# Patient Record
Sex: MALE | Race: OTHER | HISPANIC OR LATINO
[De-identification: names, ages, dates, MRNs, and addresses within clinical notes are randomized per-mention and may not be internally consistent; named-entity substitution may affect disease eponyms.]

---

## 2021-05-10 ENCOUNTER — APPOINTMENT (OUTPATIENT)
Dept: DISASTER EMERGENCY | Facility: OTHER | Age: 31
End: 2021-05-10
Payer: COMMERCIAL

## 2021-05-10 PROCEDURE — 0012A: CPT

## 2021-05-17 PROBLEM — Z00.00 ENCOUNTER FOR PREVENTIVE HEALTH EXAMINATION: Status: ACTIVE | Noted: 2021-05-17

## 2021-05-20 ENCOUNTER — APPOINTMENT (OUTPATIENT)
Dept: ALLERGY | Facility: CLINIC | Age: 31
End: 2021-05-20

## 2022-10-26 ENCOUNTER — EMERGENCY (EMERGENCY)
Facility: HOSPITAL | Age: 32
LOS: 1 days | Discharge: HOME | End: 2022-10-26
Admitting: STUDENT IN AN ORGANIZED HEALTH CARE EDUCATION/TRAINING PROGRAM

## 2022-10-26 VITALS
SYSTOLIC BLOOD PRESSURE: 140 MMHG | HEART RATE: 86 BPM | OXYGEN SATURATION: 98 % | DIASTOLIC BLOOD PRESSURE: 87 MMHG | TEMPERATURE: 97 F | RESPIRATION RATE: 20 BRPM

## 2022-10-26 PROCEDURE — 71046 X-RAY EXAM CHEST 2 VIEWS: CPT | Mod: 26

## 2022-10-26 PROCEDURE — 99285 EMERGENCY DEPT VISIT HI MDM: CPT

## 2022-10-26 PROCEDURE — 93010 ELECTROCARDIOGRAM REPORT: CPT

## 2022-10-26 RX ORDER — IPRATROPIUM/ALBUTEROL SULFATE 18-103MCG
3 AEROSOL WITH ADAPTER (GRAM) INHALATION ONCE
Refills: 0 | Status: COMPLETED | OUTPATIENT
Start: 2022-10-26 | End: 2022-10-26

## 2022-10-26 RX ORDER — FLUTICASONE PROPIONATE 50 MCG
1 SPRAY, SUSPENSION NASAL
Qty: 30 | Refills: 0
Start: 2022-10-26 | End: 2022-11-04

## 2022-10-26 RX ORDER — ALBUTEROL 90 UG/1
2 AEROSOL, METERED ORAL
Qty: 20 | Refills: 0
Start: 2022-10-26 | End: 2022-11-24

## 2022-10-26 RX ADMIN — Medication 3 MILLILITER(S): at 21:07

## 2022-10-26 RX ADMIN — Medication 50 MILLIGRAM(S): at 21:06

## 2022-10-26 NOTE — ED PROVIDER NOTE - PATIENT PORTAL LINK FT
You can access the FollowMyHealth Patient Portal offered by [facility] by registering at the following website: [email or web address]. By joining [employer]’s FollowMyHealth portal, you will also be able to view your health information using other applications (apps) compatible with our system.

## 2022-10-26 NOTE — ED PROVIDER NOTE - CLINICAL SUMMARY MEDICAL DECISION MAKING FREE TEXT BOX
31 Y/O M w/ no PMH presents to ER for shortness of breath.   Breathing treatment/steroids  XR r/o CAP  Re-evaluate

## 2022-10-26 NOTE — ED ADULT NURSE NOTE - OBJECTIVE STATEMENT
Received pt in room 12. A&Ox4, ambulatory at baseline, c/o ower abd pain with urinary frequency, States seen at urgent care prescribed antibiotics today, but pain is worsening. No significant PMH. VSS. RR even and unlabored. Satting 99% room air. Medication given. Awaiting further orders from provider.

## 2022-10-26 NOTE — ED PROVIDER NOTE - OBJECTIVE STATEMENT
33 Y/O M w/ no PMH presents to ER for shortness of breath. Developed dry cough and stuffy nose 3 days ago. Has since experienced sore throat worsening cough w/ associated chest pain. Taking Dayquil w/ minimal relief. No recent travel. No sick contacts. Vaccinated. Non smoker. Denies fever, chills, nausea/vomiting, dizziness, headache, abdominal pain. No hx of asthma

## 2022-10-26 NOTE — ED PROVIDER NOTE - NS ED ROS FT
Constitutional: (-) fever   Head: Normal cephalic, Atraumatic  Eyes/ENT: (-) vision changes  Cardiovascular: (+) chest pain, (-) wheezing  Respiratory: (+) cough, (+) shortness of breath  Gastrointestinal: (-) vomiting, (-) diarrhea, (-) abdominal pain  : (-) dysuria   Musculoskeletal: (-) back pain  Integumentary: (-) rash, (-) edema  Neurological: (-)loc  Allergic/Immunologic: (-) pruritus

## 2023-12-28 ENCOUNTER — EMERGENCY (EMERGENCY)
Facility: HOSPITAL | Age: 33
LOS: 1 days | Discharge: HOME | End: 2023-12-28
Admitting: STUDENT IN AN ORGANIZED HEALTH CARE EDUCATION/TRAINING PROGRAM
Payer: MEDICAID

## 2023-12-28 VITALS
DIASTOLIC BLOOD PRESSURE: 75 MMHG | SYSTOLIC BLOOD PRESSURE: 136 MMHG | TEMPERATURE: 99 F | OXYGEN SATURATION: 99 % | RESPIRATION RATE: 16 BRPM | HEART RATE: 60 BPM

## 2023-12-28 VITALS
SYSTOLIC BLOOD PRESSURE: 162 MMHG | HEART RATE: 86 BPM | DIASTOLIC BLOOD PRESSURE: 100 MMHG | TEMPERATURE: 97 F | RESPIRATION RATE: 18 BRPM | OXYGEN SATURATION: 96 %

## 2023-12-28 PROCEDURE — 93971 EXTREMITY STUDY: CPT | Mod: 26,LT

## 2023-12-28 PROCEDURE — 73610 X-RAY EXAM OF ANKLE: CPT | Mod: 26,LT

## 2023-12-28 PROCEDURE — 99284 EMERGENCY DEPT VISIT MOD MDM: CPT

## 2023-12-28 RX ORDER — IBUPROFEN 200 MG
800 TABLET ORAL ONCE
Refills: 0 | Status: COMPLETED | OUTPATIENT
Start: 2023-12-28 | End: 2023-12-28

## 2023-12-28 RX ADMIN — Medication 800 MILLIGRAM(S): at 10:52

## 2023-12-28 RX ADMIN — Medication 800 MILLIGRAM(S): at 10:16

## 2023-12-28 NOTE — ED PROVIDER NOTE - PATIENT PORTAL LINK FT
You can access the FollowMyHealth Patient Portal offered by [facility] by registering at the following website: [email or web address]. By joining [employer]’s FollowMyHealth portal, you will also be able to view your health information using other applications (apps) compatible with our system. You can access the FollowMyHealth Patient Portal offered by [facility] by registering at the following website: [email or web address]. By joining [employer]’s FollowMyHealth portal, you will also be able to view your health information using other applications (apps) compatible with our system.

## 2023-12-28 NOTE — ED PROVIDER NOTE - NSFOLLOWUPINSTRUCTIONS_ED_ALL_ED_FT
Follow up with your PMD within 1-2 days or you can call our clinic at [phone] for an appointment  Take all of your other medications as previously prescribed.  Worsening, continued or ANY new concerning symptoms return to the Emergency Department. Follow up with your PMD within 1-2 days or you can call our clinic at [phone] for an appointment  Take all of your other medications as previously prescribed.  Worsening, continued or ANY new concerning symptoms return to the Emergency Department.

## 2023-12-28 NOTE — ED PROVIDER NOTE - PROGRESS NOTE DETAILS
Duplex ruled out DVT; x-rays unremarkable.  Stable for DC home with PMD follow-up strict return precautions.

## 2023-12-28 NOTE — ED PROVIDER NOTE - OBJECTIVE STATEMENT
33-year-old male with no known past medical history presents to the ED complaining of pain and swelling to the left leg since yesterday.  Patient states he went to the gym, was on the treadmill running and walking on an incline, and afterwards he began to have pain to the ankle radiating to the calf, worse on ambulation.  Denies any weakness numbness or tingling sensation, also denies any chest pain or shortness of breath, denies any other complaints.

## 2023-12-28 NOTE — ED ADULT NURSE NOTE - OBJECTIVE STATEMENT
Patient is a 32 yo male, denies phx, presenting with L ankle swelling and discomfort after running yesterday. Denies rolling ankle or fall. Able to walk. No calf swelling. Medicated for pain per orders. Pending x-ray/US. Fall precautions maintained.
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
## 2023-12-28 NOTE — ED PROVIDER NOTE - CLINICAL SUMMARY MEDICAL DECISION MAKING FREE TEXT BOX
33-year-old male with no known past medical history presents to the ED complaining of pain and swelling to the left leg since yesterday.  HD stable, patient is fully ambulatory in the ED with steady gait, no acute distress.  On exam, left leg noticeable swelling, no gross bony deformity or tenderness, distal pulses intact, motor sensor intact.  Impression left leg pain rule out DVT.  Plan for  duplex US, x-rays, analgesics, reassess.

## 2023-12-28 NOTE — ED ADULT NURSE NOTE - NSFALLUNIVINTERV_ED_ALL_ED
Bed/Stretcher in lowest position, wheels locked, appropriate side rails in place/Call bell, personal items and telephone in reach/Instruct patient to call for assistance before getting out of bed/chair/stretcher/Non-slip footwear applied when patient is off stretcher/[place] to call system/Physically safe environment - no spills, clutter or unnecessary equipment/Purposeful proactive rounding/Room/bathroom lighting operational, light cord in reach Bed/Stretcher in lowest position, wheels locked, appropriate side rails in place/Call bell, personal items and telephone in reach/Instruct patient to call for assistance before getting out of bed/chair/stretcher/Non-slip footwear applied when patient is off stretcher/[place] to call system/Physically safe environment - no spills, clutter or unnecessary equipment/Purposeful proactive rounding/Room/bathroom lighting operational, light cord in reach

## 2024-02-17 ENCOUNTER — EMERGENCY (EMERGENCY)
Facility: HOSPITAL | Age: 34
LOS: 1 days | Discharge: HOME | End: 2024-02-17
Attending: EMERGENCY MEDICINE | Admitting: EMERGENCY MEDICINE
Payer: MEDICAID

## 2024-02-17 VITALS — RESPIRATION RATE: 15 BRPM | OXYGEN SATURATION: 100 % | HEART RATE: 61 BPM | TEMPERATURE: 98 F

## 2024-02-17 PROCEDURE — 99283 EMERGENCY DEPT VISIT LOW MDM: CPT

## 2024-02-17 NOTE — ED PROVIDER NOTE - CLINICAL SUMMARY MEDICAL DECISION MAKING FREE TEXT BOX
33-year-old male with no past medical history presents with right hand pain since last Friday 2/9/24.  patient states he was picking up a shard from the floor when he felt, "a pop ".   Since then has been experiencing intermittent sharp pain along right palmar crease associated with certain movements, not relieved by putting right hand in a brace.  No fever/chills, swelling, redness.  Vitals within normal limits.  Exam well-appearing, right hand with full range of motion, no edema, erythema, crepitus.  Sensation and motor intact.  Presentation low concern for fracture or infection.  No urgent intervention indicated.  Will provide patient follow-up with hand specialist.  Strict return precautions.

## 2024-02-17 NOTE — ED PROVIDER NOTE - PATIENT PORTAL LINK FT
You can access the FollowMyHealth Patient Portal offered by [facility] by registering at the following website: [email or web address]. By joining [employer]’s FollowMyHealth portal, you will also be able to view your health information using other applications (apps) compatible with our system.

## 2024-02-17 NOTE — ED ADULT TRIAGE NOTE - MEANS OF ARRIVAL
ambulatory Quinolones Counseling:  I discussed with the patient the risks of fluoroquinolones including but not limited to GI upset, allergic reaction, drug rash, diarrhea, dizziness, photosensitivity, yeast infections, liver function test abnormalities, tendonitis/tendon rupture.

## 2024-02-17 NOTE — ED PROVIDER NOTE - PHYSICAL EXAMINATION
Vital signs reviewed.  CONSTITUTIONAL: NAD, awake  HEAD: Normocephalic; atraumatic  EYES: EOMI, no conjunctival injection, no scleral icterus  MOUTH/THROAT:  MMM  NECK: Trachea midline  CV: Normal S1, S2; no audible murmurs; extremities WWP  RESP: normal work of breathing; CTAB, no stridor  ABD: soft, non-distended; non-tender  : Deferred  MSK/EXT:   Full ROM of b/l hand.  No  edema, erythema, crepitus.  Strength 5/5 and Sensation intact to light touch in bilateral hand.   SKIN: No rashes on exposed skin surfaces  NEURO: Moves all extremities spontaneously with no focal deficits, speech is appropriate

## 2024-02-17 NOTE — ED PROVIDER NOTE - OBJECTIVE STATEMENT
33-year-old male with no past medical history presents with right hand pain since last Friday 2/9/24.  patient states he was picking up a shard from the floor when he felt, "a pop ".  Ever since then  he has intermittently felt sharp pain in the right palmar crease region with certain hand movements.  He put his right hand in a brace without much improvement.  No swelling, redness,.  Denies fever/chills.  Still able to have full range of motion and perform daily activities.

## 2024-02-17 NOTE — ED PROVIDER NOTE - MUSCULOSKELETAL, MLM
Spine appears normal, range of motion is not limited, no muscle or joint tenderness. FROM of fingers, wrist, and elbow. No area of tenderness. No erythema. No swelling. + distal pulses.

## 2024-02-17 NOTE — ED ADULT TRIAGE NOTE - CHIEF COMPLAINT QUOTE
presents C/O right hand pain. " I felt a pop in hand" 2/9. No complaints of chest pain, headache, nausea, dizziness, vomiting  SOB, fever, chills verbalized. no phx

## 2024-02-17 NOTE — ED PROVIDER NOTE - ATTENDING CONTRIBUTION TO CARE
Pt was seen and evaluated by me. Pt is a 34 y/o female with no significant PMHx who presented to the ED for right hand pain X 1 wk. Pt states he was picking up a shirt on 2/8/24 and felt a pop. Pt states since then noticing pain at times to the palmar crease area with certain movement but has not restricted him from use of his hand. Pt denies any swelling or redness. Pt denies any fever, chills, nausea, vomiting, SOB, chest pain, or abd pain.   VITALS: Vitals have been reviewed.  GEN APPEARANCE: Alert and cooperative, non-toxic appearing and in NAD  HEAD: Atraumatic, normocephalic.   EYES: PERRL, EOMI.   EARS: Gross hearing intact.   NOSE: No nasal discharge.   THROAT: MMM. Oral cavity and pharynx normal.   CV: RRR, S1S2, no c/r/m/g. No cyanosis or pallor. Extremities warm, well perfused. Cap refill <2 seconds. No bruits.   LUNGS: CTAB. No wheezing. No rales. No rhonchi. No diminished breath sounds.   ABDOMEN: Soft, NTND. No guarding or rebound.   MSK/EXT: Spine appears normal, no spine point tenderness. FROM of fingers, wrist, and elbow. No area of tenderness. No erythema. No swelling. + distal pulses.  NEURO: Alert, follows commands. Speech normal. Sensation and motor normal x4 extremities.   SKIN: Normal color for race, warm, dry and intact. No evidence of rash.  34 y/o female with no significant PMHx who presented to the ED for right hand pain X 1 wk.  Concern for Hand strain  Recommendations and Hand f/u

## 2024-02-17 NOTE — ED PROVIDER NOTE - NSFOLLOWUPINSTRUCTIONS_ED_ALL_ED_FT
You were seen in the ED today for right hand injury.  There is low concern for bony fracture or infection of the right hand.  No acute interventions were indicated.  However please follow-up with hand specialist to further assess and manage your injury.    Please return to the ED immediately if you start to experience fever, severe pain, swelling or redness of the right hand. You were seen in the ED today for right hand injury.  There is low concern for bony fracture or infection of the right hand.  No acute interventions were indicated.  However please follow-up with hand specialist to further assess and manage your injury.    Please return to the ED immediately if you start to experience fever, severe pain, swelling or redness of the right hand.    May take Tylenol 650mg every 4 hours or Motrin 400mg every 6 hours as needed for pain.

## 2024-03-12 ENCOUNTER — APPOINTMENT (OUTPATIENT)
Dept: ORTHOPEDIC SURGERY | Facility: CLINIC | Age: 34
End: 2024-03-12

## 2024-03-28 NOTE — ED PROVIDER NOTE - PHYSICAL EXAMINATION
Patient insurance has denied rizatriptan, quantity amount of 30 tablets per 30 days is not covered by the plan, a quantity limit is the largest amount of the drug allowed by the plan, will cover rizatriptan ODT 5 mg at 12 tablets per fill, 3 fills per 60 days for this use. Prescription needs to be changed for the insurance to cover the medication.    Vital signs reviewed.   CONSTITUTIONAL: Well-appearing; well-nourished; in no apparent distress. Non-toxic appearing.   HEAD: Normocephalic, atraumatic.  EYES: Normal conjunctiva and no sclera injection noted  ENT: normal nose; no rhinorrhea.  CARD: Normal S1, S2  RESP: Normal chest excursion with respiration; Anterior wheezing. Decreased breath sounds  ABD/GI: soft, non-distended; non-tender  EXT/MS: moves all extremities; distal pulses are normal, no pedal edema.  SKIN: Normal for age and race; warm; dry; good turgor; no apparent lesions or exudate noted.  NEURO: Awake, alert, oriented x 3.  PSYCH: Normal mood; appropriate affect.

## 2024-04-09 ENCOUNTER — EMERGENCY (EMERGENCY)
Facility: HOSPITAL | Age: 34
LOS: 1 days | Discharge: HOME | End: 2024-04-09
Admitting: EMERGENCY MEDICINE
Payer: SELF-PAY

## 2024-04-09 VITALS
RESPIRATION RATE: 18 BRPM | TEMPERATURE: 98 F | SYSTOLIC BLOOD PRESSURE: 150 MMHG | HEART RATE: 85 BPM | OXYGEN SATURATION: 99 % | DIASTOLIC BLOOD PRESSURE: 100 MMHG

## 2024-04-09 PROBLEM — Z78.9 OTHER SPECIFIED HEALTH STATUS: Chronic | Status: ACTIVE | Noted: 2024-02-17

## 2024-04-09 PROCEDURE — 99284 EMERGENCY DEPT VISIT MOD MDM: CPT

## 2024-04-09 RX ORDER — FLUTICASONE PROPIONATE 50 MCG
1 SPRAY, SUSPENSION NASAL
Qty: 1 | Refills: 0
Start: 2024-04-09

## 2024-04-09 NOTE — ED PROVIDER NOTE - OBJECTIVE STATEMENT
33-year-old male no significant past medical history presents to ED complaining of congestion, right-sided facial pain and right ear pain x 1 week.  Patient states went for started having symptoms was having fevers.  Fevers have now subsided however is still having this lingering right facial pain and congestion.  Patient has been taking oral Sudafed without much improvement.  Denies any cough, SOB, nausea, vomiting, abdominal pain, headache, dizziness.

## 2024-04-09 NOTE — ED PROVIDER NOTE - NSFOLLOWUPINSTRUCTIONS_ED_ALL_ED_FT
Take augmentin 875mg 2x/day for 7 days.  Use flonase one spray each nostril twice a day.   Follow up with your PCP in 2 days.  Return to ED for any worsening facial pain, fever or any other concerning symptoms.

## 2024-04-09 NOTE — ED ADULT TRIAGE NOTE - CHIEF COMPLAINT QUOTE
pt c.o of recent cold, cough and fever, here today with sinus congestion, pt denies any cough or sob @ present

## 2024-04-09 NOTE — ED PROVIDER NOTE - CLINICAL SUMMARY MEDICAL DECISION MAKING FREE TEXT BOX
33-year-old male no significant past medical history presents to ED complaining of congestion, right-sided facial pain and right ear pain x 1 week.  Patient states went for started having symptoms was having fevers.  Fevers have now subsided however is still having this lingering right facial pain and congestion.  Patient has been taking oral Sudafed without much improvement.  Denies any cough, SOB, nausea, vomiting, abdominal pain, headache, dizziness.  r/o sinusitis- will start augmentin and flonase.  dc with outpatient follow up.

## 2024-04-09 NOTE — ED PROVIDER NOTE - PATIENT PORTAL LINK FT
You can access the FollowMyHealth Patient Portal offered by [facility] by registering at the following website: [email or web address]. By joining [employer]’s FollowMyHealth portal, you will also be able to view your health information using other applications (apps) compatible with our system.

## 2025-04-14 ENCOUNTER — EMERGENCY (EMERGENCY)
Facility: HOSPITAL | Age: 35
LOS: 1 days | End: 2025-04-14
Attending: EMERGENCY MEDICINE | Admitting: EMERGENCY MEDICINE
Payer: SELF-PAY

## 2025-04-14 VITALS
HEART RATE: 90 BPM | WEIGHT: 192.9 LBS | DIASTOLIC BLOOD PRESSURE: 84 MMHG | OXYGEN SATURATION: 95 % | TEMPERATURE: 98 F | SYSTOLIC BLOOD PRESSURE: 158 MMHG | RESPIRATION RATE: 18 BRPM

## 2025-04-14 PROCEDURE — 99284 EMERGENCY DEPT VISIT MOD MDM: CPT

## 2025-04-14 RX ORDER — POLYETHYLENE GLYCOL-3350 AND ELECTROLYTES 236; 6.74; 5.86; 2.97; 22.74 G/274.31G; G/274.31G; G/274.31G; G/274.31G; G/274.31G
4000 POWDER, FOR SOLUTION ORAL ONCE
Refills: 0 | Status: DISCONTINUED | OUTPATIENT
Start: 2025-04-14 | End: 2025-04-14

## 2025-04-14 RX ORDER — SENNA 187 MG
1 TABLET ORAL
Qty: 14 | Refills: 0
Start: 2025-04-14 | End: 2025-04-20

## 2025-04-14 RX ORDER — MAGNESIUM CITRATE
296 SOLUTION, ORAL ORAL ONCE
Refills: 0 | Status: COMPLETED | OUTPATIENT
Start: 2025-04-14 | End: 2025-04-14

## 2025-04-14 RX ORDER — DOCUSATE SODIUM 100 MG
1 CAPSULE ORAL
Qty: 14 | Refills: 0
Start: 2025-04-14 | End: 2025-04-20

## 2025-04-14 RX ADMIN — Medication 296 MILLILITER(S): at 17:47

## 2025-04-14 NOTE — ED PROVIDER NOTE - ATTENDING CONTRIBUTION TO CARE
Brief HPI:   35 yo M denies past medical or surgical history presents for constipation for six days.  Patient states he consumed herbal "liver cleanse" treatment one week ago.  Since that time he reports constipation, bloating, and small caliber stools.  Denies fever, chills, nausea, vomiting, abdominal pain, diarrhea, bloody or dark stool.  Patient reports passing flatus daily.     Vitals:   Reviewed    Exam:    GEN:  Non-toxic appearing, non-distressed, speaking full sentences, non-diaphoretic, AAOx3  HEENT:  NCAT, neck supple, EOMI, PERRLA, sclera anicteric, no conjunctival pallor or injection, no stridor, normal voice, no tonsillar exudate, uvula midline  CV:  regular rhythm and rate, s1/s2 audible, no murmurs, rubs or gallops, peripheral pulses 2+ and symmetric  PULM:  non-labored respirations, lungs clear to auscultation bilaterally, no wheezes, crackles or rales  ABD:  non distended, non-tender, no rebound, no guarding, negative Pal's sign, bowel sounds normal, no cvat  MSK:  no gross deformity, non-tender extremities and joints, range of motion grossly normal appearing, no extremity edema, extremities warm and well perfused   NEURO:  AAOx3, CN II-XII intact, motor 5/5 in upper and lower extremities bilaterally, sensation grossly intact in extremities and trunk, no gait deficit  SKIN:  warm, dry, no rash or vesicles     A/P:   35 yo M denies past medical or surgical history presents for constipation for six days.  VSS AF.  Exam non-toxic appearing, abdomen non-tender.  Low c/f acute surgical pathology given benign exam.  No obstructive symptoms.  Will provide supportive care.  Disposition pending.

## 2025-04-14 NOTE — ED PROVIDER NOTE - PATIENT PORTAL LINK FT
You can access the FollowMyHealth Patient Portal offered by [facility] by registering at the following website: [email or web address]. By joining [employer]’s FollowMyHealth portal, you will also be able to view your health information using other applications (apps) compatible with our system.

## 2025-04-14 NOTE — ED ADULT TRIAGE NOTE - PATIENT ON (OXYGEN DELIVERY METHOD)
pt awake and alert fully recovered post sedation. pt tolerated PO and was able to ambulate steadily. +motor and sensation in RUE. cast in place. safety maintained. pending d/c pt awake and alert. easy WOB. pain improved. awaiting xray results. dad at bedside. safety maintained. pt resting comfortably in stretcher. awake and alert. no pain endorsed at this time. PIV placed fro sedation. dad at bedside, safety maintained. room air

## 2025-04-14 NOTE — ED PROVIDER NOTE - NSFOLLOWUPINSTRUCTIONS_ED_ALL_ED_FT
You are seen today for constipation.  You are given magnesium citrate which will help you have a bowel movement.  Do not be alarmed if it is going to be diarrhea.  Though to be alarmed if you start having blood in the stool and have any bowel movements you are having.  If you start having fevers chills chest pain, shortness of breath, abdominal pain, nausea vomiting, changes in urination, please return to the emergency department.  If you also start having no bowel movements with no passing of gas also please return to the emergency department.

## 2025-04-14 NOTE — ED PROVIDER NOTE - CLINICAL SUMMARY MEDICAL DECISION MAKING FREE TEXT BOX
Young male with last bowel movement this morning was pellets hard, difficulty with constipation for the last 6 days.  No bloody stools.  No rectal pain.  No fevers chills abdominal pain nausea vomiting.  No abdominal surgeries.  No testicular pain.  Notes prior to the onset of constipation he took 1 day of a [facility] cleanser from [facility] and then had normal bowel movement and then the day after he started to have constipation and bloating.    Exam–S1-S2 present no extra murmurs extra heart sounds normal cardiac, clear lungs bilateral throughout, abdomen soft nontender nondistended.    Vitals within normal limits exam benign likely constipation versus obstipation will treat with mag citrate.  Unlikely [place] unlikely SBO given passing stool even though minimal amounts and passing flatus.  Unlikely colon cancer because no rectal bleeding and no weight loss.  Will give prescription for senna and Colace for outpatient management of constipation as well.

## 2025-04-14 NOTE — ED ADULT NURSE NOTE - OBJECTIVE STATEMENT
pt received to intake 1, A&Ox4, ambulatory, denies past medical hx, coming to ED c/o epigastric abd pain and constipation x5 days. Pt reports he is able to pass flatulence. Denies N/V, headache, dizziness, blurry vision, fever, chills, chest pain or SOB. RR even and unlabored on RA. abdomen soft nondistended but tender on palpation. no neuro deficits noted. skin intact. medicated as ordered.

## 2025-04-14 NOTE — ED PROVIDER NOTE - PROGRESS NOTE DETAILS
Patient took magnesium citrate in the ER will go home.  Discussed side effects patient understands.  Strict return precautions verbalized understood.